# Patient Record
Sex: FEMALE | URBAN - METROPOLITAN AREA
[De-identification: names, ages, dates, MRNs, and addresses within clinical notes are randomized per-mention and may not be internally consistent; named-entity substitution may affect disease eponyms.]

---

## 2023-07-28 ENCOUNTER — APPOINTMENT (RX ONLY)
Dept: URBAN - METROPOLITAN AREA CLINIC 54 | Facility: CLINIC | Age: 43
Setting detail: DERMATOLOGY
End: 2023-07-28

## 2023-07-28 DIAGNOSIS — R21 RASH AND OTHER NONSPECIFIC SKIN ERUPTION: ICD-10-CM

## 2023-07-28 PROCEDURE — ? TREATMENT REGIMEN

## 2023-07-28 PROCEDURE — ? COUNSELING

## 2023-07-28 PROCEDURE — ? PRESCRIPTION

## 2023-07-28 PROCEDURE — 11105 PUNCH BX SKIN EA SEP/ADDL: CPT

## 2023-07-28 PROCEDURE — 11104 PUNCH BX SKIN SINGLE LESION: CPT

## 2023-07-28 PROCEDURE — ? BIOPSY BY PUNCH METHOD

## 2023-07-28 PROCEDURE — ? SEPARATE AND IDENTIFIABLE DOCUMENTATION

## 2023-07-28 PROCEDURE — 99202 OFFICE O/P NEW SF 15 MIN: CPT | Mod: 25

## 2023-07-28 RX ORDER — DOXYCYCLINE HYCLATE 100 MG/1
CAPSULE, GELATIN COATED ORAL BID
Qty: 60 | Refills: 0 | Status: ERX | COMMUNITY
Start: 2023-07-28

## 2023-07-28 RX ADMIN — DOXYCYCLINE HYCLATE: 100 CAPSULE, GELATIN COATED ORAL at 00:00

## 2023-07-28 ASSESSMENT — LOCATION DETAILED DESCRIPTION DERM
LOCATION DETAILED: LEFT ELBOW
LOCATION DETAILED: LEFT PROXIMAL DORSAL FOREARM

## 2023-07-28 ASSESSMENT — LOCATION SIMPLE DESCRIPTION DERM
LOCATION SIMPLE: LEFT ELBOW
LOCATION SIMPLE: LEFT FOREARM

## 2023-07-28 ASSESSMENT — LOCATION ZONE DERM: LOCATION ZONE: ARM

## 2023-07-28 NOTE — PROCEDURE: BIOPSY BY PUNCH METHOD
Body Location Override (Optional - Billing Will Still Be Based On Selected Body Map Location If Applicable): left elbow
Detail Level: Detailed
Was A Bandage Applied: Yes
Punch Size In Mm: 4
Size Of Lesion In Cm (Optional): 0
Depth Of Punch Biopsy: dermis
Biopsy Type: H and E
Anesthesia Type: 1% lidocaine with epinephrine
Anesthesia Volume In Cc: 0.5
Hemostasis: Pressure
Epidermal Sutures: 3-0 Nylon
Wound Care: Bacitracin
Dressing: Band-Aid
Suture Removal: 14 days
Patient Will Remove Sutures At Home?: No
Lab: [id number]
Lab Facility: [id number]
Path Notes (To The Dermatopathologist): 4mm R/o granuloma annulare vs dermatophytosis vs others
Consent: Written consent was obtained and risks were reviewed including but not limited to scarring, infection, bleeding, scabbing, incomplete removal, nerve damage and allergy to anesthesia.
Post-Care Instructions: I reviewed with the patient in detail post-care instructions. Patient is to keep the biopsy site dry overnight, and then apply bacitracin twice daily until healed. Patient may apply hydrogen peroxide soaks to remove any crusting.
Home Suture Removal Text: Patient was provided a home suture removal kit and will remove their sutures at home.  If they have any questions or difficulties they will call the office.
Notification Instructions: Patient will be notified of biopsy results. However, patient instructed to call the office if not contacted within 2 weeks.
Billing Type: Third-Party Bill
Information: Selecting Yes will display possible errors in your note based on the variables you have selected. This validation is only offered as a suggestion for you. PLEASE NOTE THAT THE VALIDATION TEXT WILL BE REMOVED WHEN YOU FINALIZE YOUR NOTE. IF YOU WANT TO FAX A PRELIMINARY NOTE YOU WILL NEED TO TOGGLE THIS TO 'NO' IF YOU DO NOT WANT IT IN YOUR FAXED NOTE.
Body Location Override (Optional - Billing Will Still Be Based On Selected Body Map Location If Applicable): left forearm
Path Notes (To The Dermatopathologist): 3mm R/O granuloma annulare vs dermatophytosis vs others

## 2023-07-28 NOTE — PROCEDURE: TREATMENT REGIMEN
Initiate Treatment: doxycycline hyclate 100 mg capsule, Take one capsule by mouth twice a day 30 mins after a meal
Detail Level: Zone

## 2023-08-11 ENCOUNTER — APPOINTMENT (RX ONLY)
Dept: URBAN - METROPOLITAN AREA CLINIC 54 | Facility: CLINIC | Age: 43
Setting detail: DERMATOLOGY
End: 2023-08-11

## 2023-08-11 ENCOUNTER — RX ONLY (OUTPATIENT)
Age: 43
Setting detail: RX ONLY
End: 2023-08-11

## 2023-08-11 DIAGNOSIS — L92.0 GRANULOMA ANNULARE: ICD-10-CM

## 2023-08-11 PROCEDURE — ? TREATMENT REGIMEN

## 2023-08-11 PROCEDURE — ? PRESCRIPTION

## 2023-08-11 PROCEDURE — 99213 OFFICE O/P EST LOW 20 MIN: CPT | Mod: 25

## 2023-08-11 PROCEDURE — ? COUNSELING

## 2023-08-11 PROCEDURE — 11900 INJECT SKIN LESIONS </W 7: CPT

## 2023-08-11 PROCEDURE — ? SEPARATE AND IDENTIFIABLE DOCUMENTATION

## 2023-08-11 PROCEDURE — ? INTRALESIONAL KENALOG

## 2023-08-11 PROCEDURE — ? PATHOLOGY DISCUSSION

## 2023-08-11 RX ORDER — DOXYCYCLINE HYCLATE 100 MG/1
CAPSULE, GELATIN COATED ORAL
Qty: 60 | Refills: 0 | Status: ERX

## 2023-08-11 RX ORDER — HALOBETASOL PROPIONATE 0.5 MG/G
OINTMENT TOPICAL
Qty: 50 | Refills: 0 | Status: ERX | COMMUNITY
Start: 2023-08-11

## 2023-08-11 RX ORDER — DOXYCYCLINE 100 MG/1
CAPSULE ORAL BID
Qty: 60 | Refills: 0 | Status: CANCELLED | COMMUNITY
Start: 2023-08-11

## 2023-08-11 RX ADMIN — DOXYCYCLINE: 100 CAPSULE ORAL at 00:00

## 2023-08-11 RX ADMIN — HALOBETASOL PROPIONATE: 0.5 OINTMENT TOPICAL at 00:00

## 2023-08-11 ASSESSMENT — LOCATION DETAILED DESCRIPTION DERM
LOCATION DETAILED: LEFT ELBOW
LOCATION DETAILED: RIGHT ANTERIOR PROXIMAL THIGH
LOCATION DETAILED: LEFT ANTERIOR PROXIMAL THIGH
LOCATION DETAILED: LEFT PROXIMAL DORSAL FOREARM

## 2023-08-11 ASSESSMENT — LOCATION SIMPLE DESCRIPTION DERM
LOCATION SIMPLE: LEFT ELBOW
LOCATION SIMPLE: RIGHT THIGH
LOCATION SIMPLE: LEFT FOREARM
LOCATION SIMPLE: LEFT THIGH

## 2023-08-11 ASSESSMENT — LOCATION ZONE DERM
LOCATION ZONE: LEG
LOCATION ZONE: ARM

## 2023-08-11 NOTE — PROCEDURE: INTRALESIONAL KENALOG
Require Ndc Code?: No
Validate Note Data When Using Inventory: Yes
Show Inventory Tab: Hide
Detail Level: Detailed
Expiration Date For Kenalog (Optional): 10/2024
Medical Necessity Clause: This procedure was medically necessary because the lesions that were treated were:
How Many Mls Were Removed From The 40 Mg/Ml (1ml) Vial When Preparing The Injectable Solution?: 0
Lot # For Kenalog (Optional): [id number]
Total Volume (Ccs): 2.5
Concentration Of Kenalog Solution Injected (Mg/Ml): 3.0
Kenalog Type Of Vial: Multiple Dose
Administered By (Optional): MARLYS Brumfield
Kenalog Preparation: Kenalog
Consent: The risks of atrophy were reviewed with the patient.
Ndc# For Kenalog Only: 88329-070-68

## 2023-08-11 NOTE — PROCEDURE: TREATMENT REGIMEN
Initiate Treatment: halobetasol propionate 0.05 % topical ointment Apply to affected area on thighs and arms twice a day 2 weeks on 2 weeks off
Detail Level: Zone
Continue Regimen: doxycycline monohydrate 100 mg capsule BID Take one tab by mouth twice a day 30 mins after meal

## 2023-08-25 ENCOUNTER — APPOINTMENT (RX ONLY)
Dept: URBAN - METROPOLITAN AREA CLINIC 54 | Facility: CLINIC | Age: 43
Setting detail: DERMATOLOGY
End: 2023-08-25

## 2023-08-25 DIAGNOSIS — L92.0 GRANULOMA ANNULARE: ICD-10-CM | Status: IMPROVED

## 2023-08-25 PROCEDURE — ? COUNSELING

## 2023-08-25 PROCEDURE — ? INTRALESIONAL KENALOG

## 2023-08-25 PROCEDURE — ? TREATMENT REGIMEN

## 2023-08-25 PROCEDURE — 11900 INJECT SKIN LESIONS </W 7: CPT

## 2023-08-25 PROCEDURE — ? SEPARATE AND IDENTIFIABLE DOCUMENTATION

## 2023-08-25 PROCEDURE — 99213 OFFICE O/P EST LOW 20 MIN: CPT | Mod: 25

## 2023-08-25 ASSESSMENT — LOCATION ZONE DERM
LOCATION ZONE: LEG
LOCATION ZONE: ARM
LOCATION ZONE: FEET

## 2023-08-25 ASSESSMENT — LOCATION DETAILED DESCRIPTION DERM
LOCATION DETAILED: LEFT DORSAL FOOT
LOCATION DETAILED: LEFT PROXIMAL DORSAL FOREARM
LOCATION DETAILED: LEFT DISTAL PRETIBIAL REGION
LOCATION DETAILED: RIGHT PROXIMAL DORSAL FOREARM
LOCATION DETAILED: LEFT ELBOW
LOCATION DETAILED: RIGHT DISTAL DORSAL FOREARM

## 2023-08-25 ASSESSMENT — LOCATION SIMPLE DESCRIPTION DERM
LOCATION SIMPLE: LEFT PRETIBIAL REGION
LOCATION SIMPLE: LEFT FOOT
LOCATION SIMPLE: LEFT FOREARM
LOCATION SIMPLE: RIGHT FOREARM
LOCATION SIMPLE: LEFT ELBOW

## 2023-08-25 NOTE — PROCEDURE: TREATMENT REGIMEN
Detail Level: Zone
Continue Regimen: doxycycline monohydrate 100 mg capsule BID Take one tab by mouth twice a day 30 mins after meal\\nhalobetasol propionate 0.05 % topical ointment Apply to affected area on thighs and arms twice a day 2 weeks on 2 weeks off

## 2023-08-25 NOTE — PROCEDURE: INTRALESIONAL KENALOG
Require Ndc Code?: No
Validate Note Data When Using Inventory: Yes
Show Inventory Tab: Hide
Detail Level: Detailed
Expiration Date For Kenalog (Optional): 10/2024
Medical Necessity Clause: This procedure was medically necessary because the lesions that were treated were:
How Many Mls Were Removed From The 40 Mg/Ml (1ml) Vial When Preparing The Injectable Solution?: 0
Lot # For Kenalog (Optional): [id number]
Total Volume (Ccs): 2.5
Concentration Of Kenalog Solution Injected (Mg/Ml): 3.0
Kenalog Type Of Vial: Multiple Dose
Administered By (Optional): MARLYS Brumfield
Kenalog Preparation: Kenalog
Consent: The risks of atrophy were reviewed with the patient.
Ndc# For Kenalog Only: 63031-301-26

## 2023-09-15 ENCOUNTER — RX ONLY (OUTPATIENT)
Age: 43
Setting detail: RX ONLY
End: 2023-09-15

## 2023-09-15 ENCOUNTER — APPOINTMENT (RX ONLY)
Dept: URBAN - METROPOLITAN AREA CLINIC 54 | Facility: CLINIC | Age: 43
Setting detail: DERMATOLOGY
End: 2023-09-15

## 2023-09-15 DIAGNOSIS — L92.0 GRANULOMA ANNULARE: ICD-10-CM

## 2023-09-15 PROCEDURE — 11900 INJECT SKIN LESIONS </W 7: CPT

## 2023-09-15 PROCEDURE — ? COUNSELING

## 2023-09-15 PROCEDURE — 99213 OFFICE O/P EST LOW 20 MIN: CPT | Mod: 25

## 2023-09-15 PROCEDURE — ? INTRALESIONAL KENALOG

## 2023-09-15 PROCEDURE — ? SEPARATE AND IDENTIFIABLE DOCUMENTATION

## 2023-09-15 PROCEDURE — ? TREATMENT REGIMEN

## 2023-09-15 RX ORDER — DOXYCYCLINE HYCLATE 100 MG/1
CAPSULE, GELATIN COATED ORAL
Qty: 60 | Refills: 0 | Status: ERX

## 2023-09-15 RX ORDER — HALOBETASOL PROPIONATE 0.5 MG/G
OINTMENT TOPICAL
Qty: 50 | Refills: 0 | Status: ERX

## 2023-09-15 ASSESSMENT — LOCATION SIMPLE DESCRIPTION DERM
LOCATION SIMPLE: LEFT ELBOW
LOCATION SIMPLE: LEFT FOREARM
LOCATION SIMPLE: RIGHT FOREARM
LOCATION SIMPLE: LEFT PRETIBIAL REGION
LOCATION SIMPLE: LEFT FOOT

## 2023-09-15 ASSESSMENT — LOCATION DETAILED DESCRIPTION DERM
LOCATION DETAILED: LEFT DORSAL FOOT
LOCATION DETAILED: RIGHT DISTAL DORSAL FOREARM
LOCATION DETAILED: LEFT DISTAL PRETIBIAL REGION
LOCATION DETAILED: LEFT PROXIMAL DORSAL FOREARM
LOCATION DETAILED: LEFT ELBOW
LOCATION DETAILED: RIGHT PROXIMAL DORSAL FOREARM

## 2023-09-15 ASSESSMENT — LOCATION ZONE DERM
LOCATION ZONE: LEG
LOCATION ZONE: FEET
LOCATION ZONE: ARM

## 2023-09-15 NOTE — PROCEDURE: INTRALESIONAL KENALOG
Require Ndc Code?: No
Validate Note Data When Using Inventory: Yes
Show Inventory Tab: Hide
Detail Level: Detailed
Expiration Date For Kenalog (Optional): 10/2024
Medical Necessity Clause: This procedure was medically necessary because the lesions that were treated were:
How Many Mls Were Removed From The 40 Mg/Ml (1ml) Vial When Preparing The Injectable Solution?: 0
Lot # For Kenalog (Optional): [id number]
Total Volume (Ccs): 2.5
Concentration Of Kenalog Solution Injected (Mg/Ml): 3.0
Kenalog Type Of Vial: Multiple Dose
Administered By (Optional): MARLYS Brumfield
Kenalog Preparation: Kenalog
Consent: The risks of atrophy were reviewed with the patient.
Ndc# For Kenalog Only: [id number]

## 2023-09-15 NOTE — PROCEDURE: TREATMENT REGIMEN
Plan: For 3 months will continue doxycycline twice daily
Detail Level: Zone
Continue Regimen: doxycycline monohydrate 100 mg capsule BID Take one tab by mouth twice a day 30 mins after meal\\nhalobetasol propionate 0.05 % topical ointment Apply to affected area on thighs and arms twice a day 2 weeks on 2 weeks off

## 2023-10-13 ENCOUNTER — APPOINTMENT (RX ONLY)
Dept: URBAN - METROPOLITAN AREA CLINIC 54 | Facility: CLINIC | Age: 43
Setting detail: DERMATOLOGY
End: 2023-10-13

## 2023-10-13 ENCOUNTER — RX ONLY (OUTPATIENT)
Age: 43
Setting detail: RX ONLY
End: 2023-10-13

## 2023-10-13 DIAGNOSIS — L92.0 GRANULOMA ANNULARE: ICD-10-CM

## 2023-10-13 PROCEDURE — ? TREATMENT REGIMEN

## 2023-10-13 PROCEDURE — ? INTRALESIONAL KENALOG

## 2023-10-13 PROCEDURE — 99213 OFFICE O/P EST LOW 20 MIN: CPT | Mod: 25

## 2023-10-13 PROCEDURE — ? SEPARATE AND IDENTIFIABLE DOCUMENTATION

## 2023-10-13 PROCEDURE — ? COUNSELING

## 2023-10-13 PROCEDURE — 11900 INJECT SKIN LESIONS </W 7: CPT

## 2023-10-13 RX ORDER — DOXYCYCLINE HYCLATE 100 MG/1
CAPSULE, GELATIN COATED ORAL
Qty: 60 | Refills: 0 | Status: ERX

## 2023-10-13 ASSESSMENT — LOCATION SIMPLE DESCRIPTION DERM
LOCATION SIMPLE: LEFT PRETIBIAL REGION
LOCATION SIMPLE: LEFT ELBOW
LOCATION SIMPLE: LEFT FOREARM
LOCATION SIMPLE: LEFT FOOT
LOCATION SIMPLE: RIGHT FOREARM

## 2023-10-13 ASSESSMENT — LOCATION DETAILED DESCRIPTION DERM
LOCATION DETAILED: LEFT ELBOW
LOCATION DETAILED: LEFT DORSAL FOOT
LOCATION DETAILED: RIGHT PROXIMAL DORSAL FOREARM
LOCATION DETAILED: LEFT DISTAL PRETIBIAL REGION
LOCATION DETAILED: LEFT PROXIMAL DORSAL FOREARM
LOCATION DETAILED: RIGHT DISTAL DORSAL FOREARM

## 2023-10-13 ASSESSMENT — LOCATION ZONE DERM
LOCATION ZONE: FEET
LOCATION ZONE: LEG
LOCATION ZONE: ARM

## 2023-10-13 NOTE — PROCEDURE: INTRALESIONAL KENALOG
Require Ndc Code?: No
Validate Note Data When Using Inventory: Yes
Show Inventory Tab: Hide
Detail Level: Detailed
Expiration Date For Kenalog (Optional): 10/2024
Medical Necessity Clause: This procedure was medically necessary because the lesions that were treated were:
How Many Mls Were Removed From The 40 Mg/Ml (1ml) Vial When Preparing The Injectable Solution?: 0
Lot # For Kenalog (Optional): [id number]
Total Volume (Ccs): 2.5
Concentration Of Kenalog Solution Injected (Mg/Ml): 3.0
Kenalog Type Of Vial: Multiple Dose
Administered By (Optional): MARLYS Brumfield
Kenalog Preparation: Kenalog
Consent: The risks of atrophy were reviewed with the patient.
Ndc# For Kenalog Only: [id number]

## 2023-11-17 ENCOUNTER — RX ONLY (OUTPATIENT)
Age: 43
Setting detail: RX ONLY
End: 2023-11-17

## 2023-11-17 ENCOUNTER — APPOINTMENT (RX ONLY)
Dept: URBAN - METROPOLITAN AREA CLINIC 54 | Facility: CLINIC | Age: 43
Setting detail: DERMATOLOGY
End: 2023-11-17

## 2023-11-17 DIAGNOSIS — L92.0 GRANULOMA ANNULARE: ICD-10-CM | Status: WORSENING

## 2023-11-17 PROCEDURE — 11901 INJECT SKIN LESIONS >7: CPT

## 2023-11-17 PROCEDURE — ? PRESCRIPTION

## 2023-11-17 PROCEDURE — ? TREATMENT REGIMEN

## 2023-11-17 PROCEDURE — ? COUNSELING

## 2023-11-17 PROCEDURE — ? SEPARATE AND IDENTIFIABLE DOCUMENTATION

## 2023-11-17 PROCEDURE — 99213 OFFICE O/P EST LOW 20 MIN: CPT | Mod: 25

## 2023-11-17 PROCEDURE — ? INTRALESIONAL KENALOG

## 2023-11-17 RX ORDER — DOXYCYCLINE HYCLATE 100 MG/1
CAPSULE, GELATIN COATED ORAL
Qty: 60 | Refills: 0 | Status: ERX

## 2023-11-17 RX ORDER — TACROLIMUS 0.3 MG/G
OINTMENT TOPICAL
Qty: 60 | Refills: 0 | Status: ERX | COMMUNITY
Start: 2023-11-17

## 2023-11-17 RX ORDER — HALOBETASOL PROPIONATE 0.5 MG/G
OINTMENT TOPICAL
Qty: 50 | Refills: 0 | Status: ERX

## 2023-11-17 RX ADMIN — TACROLIMUS: 0.3 OINTMENT TOPICAL at 00:00

## 2023-11-17 ASSESSMENT — LOCATION DETAILED DESCRIPTION DERM
LOCATION DETAILED: LEFT SUPERIOR LATERAL UPPER BACK
LOCATION DETAILED: LEFT PROXIMAL PRETIBIAL REGION
LOCATION DETAILED: RIGHT ANTERIOR DISTAL THIGH
LOCATION DETAILED: LEFT ANTERIOR PROXIMAL THIGH
LOCATION DETAILED: RIGHT ANTERIOR PROXIMAL THIGH
LOCATION DETAILED: RIGHT SUPERIOR LATERAL UPPER BACK
LOCATION DETAILED: RIGHT PROXIMAL PRETIBIAL REGION

## 2023-11-17 ASSESSMENT — LOCATION SIMPLE DESCRIPTION DERM
LOCATION SIMPLE: LEFT UPPER BACK
LOCATION SIMPLE: RIGHT BACK
LOCATION SIMPLE: LEFT THIGH
LOCATION SIMPLE: LEFT PRETIBIAL REGION
LOCATION SIMPLE: RIGHT THIGH
LOCATION SIMPLE: RIGHT PRETIBIAL REGION

## 2023-11-17 ASSESSMENT — LOCATION ZONE DERM
LOCATION ZONE: TRUNK
LOCATION ZONE: LEG

## 2023-11-17 NOTE — PROCEDURE: INTRALESIONAL KENALOG
How Many Mls Were Removed From The 40 Mg/Ml (5ml) Vial When Preparing The Injectable Solution?: 0
Administered By (Optional): Corinna Tam
Validate Note Data When Using Inventory: Yes
Bill For Wasted Drug?: no
Lot # (Optional): [id number]
Kenalog Type Of Vial: Multiple Dose
Consent: The risks of atrophy were reviewed with the patient.
Kenalog Preparation: Kenalog
Show Inventory Tab: Hide
Medical Necessity Clause: This procedure was medically necessary because the lesions that were treated were:
Concentration Of Solution Injected (Mg/Ml): 3.0
Expiration Date (Optional): 10/2024
Detail Level: Detailed
Ndc# For Kenalog Only: [id number]

## 2024-01-12 ENCOUNTER — RX ONLY (OUTPATIENT)
Age: 44
Setting detail: RX ONLY
End: 2024-01-12

## 2024-01-12 ENCOUNTER — APPOINTMENT (RX ONLY)
Dept: URBAN - METROPOLITAN AREA CLINIC 54 | Facility: CLINIC | Age: 44
Setting detail: DERMATOLOGY
End: 2024-01-12

## 2024-01-12 DIAGNOSIS — L92.0 GRANULOMA ANNULARE: ICD-10-CM | Status: IMPROVED

## 2024-01-12 PROCEDURE — 99213 OFFICE O/P EST LOW 20 MIN: CPT

## 2024-01-12 PROCEDURE — ? TREATMENT REGIMEN

## 2024-01-12 PROCEDURE — ? COUNSELING

## 2024-01-12 RX ORDER — DOXYCYCLINE HYCLATE 100 MG/1
CAPSULE, GELATIN COATED ORAL
Qty: 60 | Refills: 0 | Status: ERX

## 2024-01-12 RX ORDER — HALOBETASOL PROPIONATE 0.5 MG/G
OINTMENT TOPICAL
Qty: 50 | Refills: 0 | Status: ERX

## 2024-01-12 RX ORDER — TACROLIMUS 0.3 MG/G
OINTMENT TOPICAL
Qty: 60 | Refills: 0 | Status: ERX

## 2024-07-05 ENCOUNTER — RX ONLY (OUTPATIENT)
Age: 44
Setting detail: RX ONLY
End: 2024-07-05

## 2024-07-05 ENCOUNTER — APPOINTMENT (RX ONLY)
Dept: URBAN - METROPOLITAN AREA CLINIC 54 | Facility: CLINIC | Age: 44
Setting detail: DERMATOLOGY
End: 2024-07-05

## 2024-07-05 DIAGNOSIS — L92.0 GRANULOMA ANNULARE: ICD-10-CM | Status: WELL CONTROLLED

## 2024-07-05 PROCEDURE — 99213 OFFICE O/P EST LOW 20 MIN: CPT

## 2024-07-05 PROCEDURE — ? COUNSELING

## 2024-07-05 PROCEDURE — ? TREATMENT REGIMEN

## 2024-07-05 RX ORDER — DOXYCYCLINE HYCLATE 100 MG/1
CAPSULE, GELATIN COATED ORAL
Qty: 30 | Refills: 2 | Status: ERX

## 2024-07-05 RX ORDER — TACROLIMUS 0.3 MG/G
OINTMENT TOPICAL
Qty: 60 | Refills: 2 | Status: ERX

## 2024-07-05 RX ORDER — HALOBETASOL PROPIONATE 0.5 MG/G
OINTMENT TOPICAL
Qty: 50 | Refills: 2 | Status: ERX